# Patient Record
(demographics unavailable — no encounter records)

---

## 2017-03-06 NOTE — REP
FIRST TRIMESTER ULTRASOUND:

 

Real-time sonographic evaluation of the pelvis is performed utilizing

transabdominal and endovaginal technique.

 

The uterus measures 7.7 x 3.2 x 4.2 cm. The endometrial stripe measures 2 mm.

There is no endometrial fluid collection or gestational sac seen. Trace fluid is

seen in the cul-de-sac. The ovaries appear normal in size and echotexture, right

ovary measuring 2.3 x 1.2 x 1.9 cm and the left ovary 1.7 x 2.0 x 1.9 cm.  There

is no evidence of a ovarian torsion. No adnexal mass is seen.

 

IMPRESSION:

 

Trace free fluid in the cul-de-sac. Empty uterus.  No adnexal mass.  No torsion.

Findings may indicate very early intrauterine pregnancy, missed  or

ectopic pregnancy.  Correlate with serial quantitative beta HCG values.

 

 

Signed by

Uche Mendez MD 2017 04:08 P